# Patient Record
Sex: MALE | Race: WHITE | NOT HISPANIC OR LATINO | Employment: STUDENT | URBAN - METROPOLITAN AREA
[De-identification: names, ages, dates, MRNs, and addresses within clinical notes are randomized per-mention and may not be internally consistent; named-entity substitution may affect disease eponyms.]

---

## 2023-11-22 ENCOUNTER — OCCUPATIONAL HEALTH (OUTPATIENT)
Dept: URGENT CARE | Facility: CLINIC | Age: 28
End: 2023-11-22

## 2023-11-22 VITALS
HEIGHT: 73 IN | TEMPERATURE: 99 F | WEIGHT: 130 LBS | DIASTOLIC BLOOD PRESSURE: 95 MMHG | BODY MASS INDEX: 17.23 KG/M2 | SYSTOLIC BLOOD PRESSURE: 130 MMHG | OXYGEN SATURATION: 99 % | HEART RATE: 78 BPM | RESPIRATION RATE: 19 BRPM
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
## 2023-11-22 PROCEDURE — 90715 TDAP VACCINE 7 YRS/> IM: CPT | Mod: S$GLB,,, | Performed by: NURSE PRACTITIONER

## 2023-11-22 PROCEDURE — 90471 IMMUNIZATION ADMIN: CPT | Mod: S$GLB,,, | Performed by: NURSE PRACTITIONER

## 2023-11-22 PROCEDURE — 90715 TDAP VACCINE GREATER THAN OR EQUAL TO 7YO IM: ICD-10-PCS | Mod: S$GLB,,, | Performed by: NURSE PRACTITIONER

## 2023-11-22 PROCEDURE — 90471 TDAP VACCINE GREATER THAN OR EQUAL TO 7YO IM: ICD-10-PCS | Mod: S$GLB,,, | Performed by: NURSE PRACTITIONER

## 2023-11-22 RX ORDER — AMOXICILLIN AND CLAVULANATE POTASSIUM 875; 125 MG/1; MG/1
1 TABLET, FILM COATED ORAL 2 TIMES DAILY
Qty: 20 TABLET | Refills: 0 | Status: SHIPPED | OUTPATIENT
Start: 2023-11-22 | End: 2023-12-02

## 2023-11-22 RX ORDER — MUPIROCIN 20 MG/G
OINTMENT TOPICAL
Qty: 22 G | Refills: 1 | Status: SHIPPED | OUTPATIENT
Start: 2023-11-22

## 2023-11-22 NOTE — PATIENT INSTRUCTIONS
If your condition worsens or fails to improve we recommend that you receive another evaluation at the ER immediately or contact your PCP to discuss your concerns or return here. You must understand that you've received an urgent care treatment only and that you may be released before all your medical problems are known or treated. You the patient will arrange for followup care as instructed.   Use warm epsom salt soaks for 20 minutes 3-5 times a day. Avoid picking or manipulating the wound. Clean the wound twice a day and put the antibiotic ointment on it. You should seek ER treatment if fever, increase pain, swelling or other signs of increasing infection.   If you are female and on BCP use additional methods to prevent pregnancy while on antibiotics and for one cycle after.   If you were given narcotics do not drive or operate heavy equipment or machinery while taking these medications.   Tylenol or ibuprofen can also be used as directed for pain unless you have an allergy to them or medical condition such as stomach ulcers, kidney or liver disease or blood thinners etc for which you should not be taking these type of medications.

## 2023-11-22 NOTE — PROGRESS NOTES
Subjective:      Patient ID: Lu Vo is a 28 y.o. male.    Chief Complaint: Animal Bite    This is a 28 y.o. male who presents today with a chief complaint of dog bite to the right hand that was sustained 2 days ago. Pt states that they have taken OTC Ibuprofen.   Pt last TDAP: 05/25/2018  He is right hand dominant.  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  Dog has been vaccinated against rabies.      Animal Bite   The incident occurred more than 2 days ago. The incident occurred at work. There is an injury to the Right thumb. The pain is moderate. It is unlikely that a foreign body is present. Pertinent negatives include no chest pain, no fussiness, no numbness, no visual disturbance, no abdominal pain, no bowel incontinence, no nausea, no vomiting, no bladder incontinence, no headaches, no hearing loss, no inability to bear weight, no neck pain, no pain when bearing weight, no focal weakness, no decreased responsiveness, no light-headedness, no loss of consciousness, no seizures, no tingling, no weakness, no cough, no difficulty breathing and no memory loss. There have been no prior injuries to these areas. He is Right-handed. His tetanus status is UTD. There were sick contacts at work.       HENT:  Negative for hearing loss.    Neck: Negative for neck pain.   Cardiovascular:  Negative for chest pain.   Respiratory:  Negative for cough.    Gastrointestinal:  Negative for abdominal pain, nausea, vomiting and bowel incontinence.   Genitourinary:  Negative for bladder incontinence.   Skin:  Positive for wound, puncture wound and bruising. Negative for erythema and abscess.   Neurological:  Negative for light-headedness, focal weakness, headaches, loss of consciousness, numbness and seizures.     Objective:     Physical Exam  Vitals and nursing note reviewed.   Constitutional:       General: He is not in acute distress.     Appearance: He is well-developed. He is not  ill-appearing, toxic-appearing or diaphoretic.   HENT:      Head: Normocephalic and atraumatic. No abrasion, contusion or laceration.      Right Ear: External ear normal.      Left Ear: External ear normal.      Nose: Nose normal.   Eyes:      General: Lids are normal.      Conjunctiva/sclera: Conjunctivae normal.      Pupils: Pupils are equal, round, and reactive to light.   Neck:      Trachea: Trachea and phonation normal.   Cardiovascular:      Rate and Rhythm: Normal rate and regular rhythm.      Heart sounds: Normal heart sounds.   Pulmonary:      Effort: Pulmonary effort is normal. No respiratory distress.      Breath sounds: Normal breath sounds. No stridor.   Musculoskeletal:         General: Normal range of motion.      Right wrist: Normal.      Right hand: Normal.      Cervical back: Full passive range of motion without pain and neck supple.   Skin:     General: Skin is warm and dry.      Capillary Refill: Capillary refill takes less than 2 seconds.      Findings: Bruising and wound present. No abrasion, burn, ecchymosis, erythema, laceration, lesion or rash.   Neurological:      Mental Status: He is alert and oriented to person, place, and time.   Psychiatric:         Speech: Speech normal.         Behavior: Behavior normal.         Thought Content: Thought content normal.         Judgment: Judgment normal.              Assessment:      1. Dog bite, initial encounter      Plan:   Discussed with patient rabies.  He notes that the dog is actually vaccinated for rabies.  He was concerned because the dog was from [place] however has lived here for over a year and has since been under care of vet and also is vaccinated.  Advised that the dog should quarantine for 10 days post bite under the care of animal control or vet to monitor.  And if there is any concern for rabies to immediately go to the ER for post exposure prophylaxis.  He understands this.    Medications Ordered This Encounter   Medications     amoxicillin-clavulanate 875-125mg (AUGMENTIN) 875-125 mg per tablet     Sig: Take 1 tablet by mouth 2 (two) times daily. for 10 days     Dispense:  20 tablet     Refill:  0    mupirocin (BACTROBAN) 2 % ointment     Sig: Apply to affected area 3 times daily     Dispense:  22 g     Refill:  1            No follow-ups on file.    Patient Instructions     If your condition worsens or fails to improve we recommend that you receive another evaluation at the ER immediately or contact your PCP to discuss your concerns or return here. You must understand that you've received an urgent care treatment only and that you may be released before all your medical problems are known or treated. You the patient will arrange for followup care as instructed.   Use warm epsom salt soaks for 20 minutes 3-5 times a day. Avoid picking or manipulating the wound. Clean the wound twice a day and put the antibiotic ointment on it. You should seek ER treatment if fever, increase pain, swelling or other signs of increasing infection.   If you are female and on BCP use additional methods to prevent pregnancy while on antibiotics and for one cycle after.   If you were given narcotics do not drive or operate heavy equipment or machinery while taking these medications.   Tylenol or ibuprofen can also be used as directed for pain unless you have an allergy to them or medical condition such as stomach ulcers, kidney or liver disease or blood thinners etc for which you should not be taking these type of medications.

## 2023-11-28 ENCOUNTER — TELEPHONE (OUTPATIENT)
Dept: URGENT CARE | Facility: CLINIC | Age: 28
End: 2023-11-28
Payer: COMMERCIAL

## 2024-05-09 ENCOUNTER — OFFICE VISIT (OUTPATIENT)
Dept: URGENT CARE | Facility: CLINIC | Age: 29
End: 2024-05-09

## 2024-05-09 VITALS
HEIGHT: 73 IN | DIASTOLIC BLOOD PRESSURE: 89 MMHG | TEMPERATURE: 99 F | OXYGEN SATURATION: 96 % | BODY MASS INDEX: 17.89 KG/M2 | RESPIRATION RATE: 19 BRPM | HEART RATE: 107 BPM | SYSTOLIC BLOOD PRESSURE: 137 MMHG | WEIGHT: 135 LBS

## 2024-05-09 DIAGNOSIS — J03.90 TONSILLITIS WITH EXUDATE: Primary | ICD-10-CM

## 2024-05-09 DIAGNOSIS — R00.0 TACHYCARDIA: ICD-10-CM

## 2024-05-09 DIAGNOSIS — R50.9 FEVER, UNSPECIFIED FEVER CAUSE: ICD-10-CM

## 2024-05-09 DIAGNOSIS — R59.0 SUBMANDIBULAR LYMPHADENOPATHY: ICD-10-CM

## 2024-05-09 DIAGNOSIS — J02.9 SORE THROAT: ICD-10-CM

## 2024-05-09 DIAGNOSIS — Z87.09 HISTORY OF STREP PHARYNGITIS: ICD-10-CM

## 2024-05-09 LAB
CTP QC/QA: YES
MOLECULAR STREP A: NEGATIVE

## 2024-05-09 PROCEDURE — 87651 STREP A DNA AMP PROBE: CPT | Mod: QW,S$GLB,, | Performed by: NURSE PRACTITIONER

## 2024-05-09 PROCEDURE — 99203 OFFICE O/P NEW LOW 30 MIN: CPT | Mod: S$GLB,,, | Performed by: NURSE PRACTITIONER

## 2024-05-09 RX ORDER — AZITHROMYCIN 250 MG/1
TABLET, FILM COATED ORAL
Qty: 6 TABLET | Refills: 0 | Status: SHIPPED | OUTPATIENT
Start: 2024-05-09 | End: 2024-05-14

## 2024-05-09 NOTE — PATIENT INSTRUCTIONS
Please drink plenty of fluids.  Please get plenty of rest.  Please return here or go to the Emergency Department for any concerns or worsening of condition.  You were prescribed antibiotics, please take them to completion.  If not allergic, please take over the counter Tylenol (Acetaminophen) and/or Motrin (Ibuprofen) as directed for control of pain and/or fever.  Please follow up with your primary care doctor or specialist as needed.    If you  smoke, please stop smoking.

## 2024-05-09 NOTE — PROGRESS NOTES
"Subjective:      Patient ID: Lu Vo is a 28 y.o. male.    Vitals:  height is 6' 1" (1.854 m) and weight is 61.2 kg (135 lb). His oral temperature is 99 °F (37.2 °C). His blood pressure is 137/89 and his pulse is 107. His respiration is 19 and oxygen saturation is 96%.     Chief Complaint: Sore Throat    Pt complains of a sore throat, some tingling, white patches at back of throat, swollen glands all symptoms started yesterday. Pt has not taken any OTC medication to help with symptoms.     28 year old male presents to clinic with complaints of sore throat, swollen neck glands, frequent throat clearing, painful swallowing x 1 day. Past history of strep. No recent oral sex    Sore Throat   This is a new problem. The current episode started yesterday. The problem has been gradually worsening. Neither side of throat is experiencing more pain than the other. There has been no fever. The pain is at a severity of 0/10. The patient is experiencing no pain. Associated symptoms include congestion, swollen glands and trouble swallowing. Pertinent negatives include no abdominal pain, coughing, diarrhea, drooling, ear discharge, ear pain, headaches, hoarse voice, plugged ear sensation, neck pain, shortness of breath, stridor or vomiting. He has had no exposure to strep or mono. He has tried nothing for the symptoms. The treatment provided mild relief.       HENT:  Positive for congestion, sore throat and trouble swallowing. Negative for ear pain, ear discharge and drooling.    Neck: Positive for painful lymph nodes. Negative for neck pain.   Respiratory:  Negative for cough, shortness of breath and stridor.    Gastrointestinal:  Negative for abdominal pain, vomiting and diarrhea.   Skin:  Negative for erythema.   Neurological:  Negative for headaches.   Hematologic/Lymphatic: Positive for swollen lymph nodes.      Objective:     Physical Exam   Constitutional: He is oriented to person, place, and time. He appears " well-developed.   HENT:   Head: Normocephalic and atraumatic. Head is without abrasion, without contusion and without laceration.   Ears:   Right Ear: External ear normal.   Left Ear: External ear normal.   Nose: Nose normal.   Mouth/Throat: Mucous membranes are normal. Oropharyngeal exudate and posterior oropharyngeal erythema present. Tonsillar exudate.   Eyes: EOM and lids are normal. Extraocular movement intact   Neck: Trachea normal and phonation normal. Neck supple.   Cardiovascular: Normal rate.   Pulmonary/Chest: Effort normal. No stridor. No respiratory distress.   Musculoskeletal: Normal range of motion.         General: Normal range of motion.   Lymphadenopathy:        Head (right side): Submandibular adenopathy present.        Head (left side): Submandibular adenopathy present.   Neurological: He is alert and oriented to person, place, and time.   Skin: Skin is warm, dry, intact and no rash. Capillary refill takes less than 2 seconds. No abrasion, No burn, No bruising, No erythema and No ecchymosis   Psychiatric: His speech is normal and behavior is normal. Judgment and thought content normal.   Nursing note and vitals reviewed.      Assessment:     1. Tonsillitis with exudate    2. Sore throat    3. Submandibular lymphadenopathy    4. Fever, unspecified fever cause    5. Tachycardia    6. History of strep pharyngitis      Results for orders placed or performed in visit on 05/09/24   POCT Strep A, Molecular   Result Value Ref Range    Molecular Strep A, POC Negative Negative     Acceptable Yes       Plan:       Tonsillitis with exudate  -     azithromycin (Z-TOMMY) 250 MG tablet; Take 2 tablets by mouth on day 1; Take 1 tablet by mouth on days 2-5  Dispense: 6 tablet; Refill: 0    Sore throat  -     POCT Strep A, Molecular    Submandibular lymphadenopathy    Fever, unspecified fever cause    Tachycardia    History of strep pharyngitis        Patient Instructions   Please drink plenty of  fluids.  Please get plenty of rest.  Please return here or go to the Emergency Department for any concerns or worsening of condition.  You were prescribed antibiotics, please take them to completion.  If not allergic, please take over the counter Tylenol (Acetaminophen) and/or Motrin (Ibuprofen) as directed for control of pain and/or fever.  Please follow up with your primary care doctor or specialist as needed.    If you  smoke, please stop smoking.